# Patient Record
Sex: FEMALE
[De-identification: names, ages, dates, MRNs, and addresses within clinical notes are randomized per-mention and may not be internally consistent; named-entity substitution may affect disease eponyms.]

---

## 2018-09-25 LAB
ANION GAP SERPL CALC-SCNC: 13.6 MMOL/L (ref 8–16)
BASOPHILS # BLD AUTO: 0 10*3/UL (ref 0–0.1)
BASOPHILS NFR BLD AUTO: 0.5 % (ref 0–1)
BUN SERPL-MCNC: 11 MG/DL (ref 7–26)
BUN/CREAT SERPL: 13 (ref 6–25)
CALCIUM SERPL-MCNC: 8.7 MG/DL (ref 8.4–10.2)
CHLORIDE SERPL-SCNC: 106 MMOL/L (ref 98–107)
CO2 SERPL-SCNC: 25 MMOL/L (ref 22–29)
DEPRECATED NEUTROPHILS # BLD AUTO: 3.4 10*3/UL (ref 2.1–6.9)
EGFRCR SERPLBLD CKD-EPI 2021: > 60 ML/MIN (ref 60–?)
EOSINOPHIL # BLD AUTO: 0.1 10*3/UL (ref 0–0.4)
EOSINOPHIL NFR BLD AUTO: 1.4 % (ref 0–6)
ERYTHROCYTE [DISTWIDTH] IN CORD BLOOD: 12.9 % (ref 11.7–14.4)
GLUCOSE SERPLBLD-MCNC: 89 MG/DL (ref 74–118)
HCT VFR BLD AUTO: 43.5 % (ref 34.2–44.1)
HGB BLD-MCNC: 13.7 G/DL (ref 12–16)
LYMPHOCYTES # BLD: 2.5 10*3/UL (ref 1–3.2)
LYMPHOCYTES NFR BLD AUTO: 38.2 % (ref 18–39.1)
MCH RBC QN AUTO: 26.9 PG (ref 28–32)
MCHC RBC AUTO-ENTMCNC: 31.5 G/DL (ref 31–35)
MCV RBC AUTO: 85.3 FL (ref 81–99)
MONOCYTES # BLD AUTO: 0.5 10*3/UL (ref 0.2–0.8)
MONOCYTES NFR BLD AUTO: 7 % (ref 4.4–11.3)
NEUTS SEG NFR BLD AUTO: 52.7 % (ref 38.7–80)
PLATELET # BLD AUTO: 179 X10E3/UL (ref 140–360)
POTASSIUM SERPL-SCNC: 3.6 MMOL/L (ref 3.5–5.1)
RBC # BLD AUTO: 5.1 X10E6/UL (ref 3.6–5.1)
SODIUM SERPL-SCNC: 141 MMOL/L (ref 136–145)

## 2018-09-25 NOTE — DIAGNOSTIC IMAGING REPORT
EXAMINATION: PA and lateral views of the chest.



COMPARISON: None



CLINICAL HISTORY:  Preoperative evaluation, plantar fasciitis

     

DISCUSSION:



Lines/tubes:  None.



Lungs:  The lungs are well inflated and clear. No pneumonia or pulmonary edema.



Pleura:  No pleural effusion or pneumothorax.



Heart and mediastinum:  The cardiomediastinal silhouette is normal.



Bones and soft tissues:  No acute bony abnormalities.  



IMPRESSION: 

No acute cardiopulmonary abnormalities.











Signed by: Dr. Andrew Palisch, M.D. on 9/25/2018 4:35 PM

## 2018-09-28 ENCOUNTER — HOSPITAL ENCOUNTER (OUTPATIENT)
Dept: HOSPITAL 88 - OR | Age: 63
Discharge: HOME | End: 2018-09-28
Attending: PODIATRIST
Payer: COMMERCIAL

## 2018-09-28 VITALS — SYSTOLIC BLOOD PRESSURE: 137 MMHG | DIASTOLIC BLOOD PRESSURE: 85 MMHG

## 2018-09-28 DIAGNOSIS — Z01.812: ICD-10-CM

## 2018-09-28 DIAGNOSIS — Z01.811: ICD-10-CM

## 2018-09-28 DIAGNOSIS — M77.32: Primary | ICD-10-CM

## 2018-09-28 DIAGNOSIS — Z01.810: ICD-10-CM

## 2018-09-28 DIAGNOSIS — M72.2: ICD-10-CM

## 2018-09-28 PROCEDURE — 85025 COMPLETE CBC W/AUTO DIFF WBC: CPT

## 2018-09-28 PROCEDURE — 28119 REMOVAL OF HEEL SPUR: CPT

## 2018-09-28 PROCEDURE — 36415 COLL VENOUS BLD VENIPUNCTURE: CPT

## 2018-09-28 PROCEDURE — 76000 FLUOROSCOPY <1 HR PHYS/QHP: CPT

## 2018-09-28 PROCEDURE — 93005 ELECTROCARDIOGRAM TRACING: CPT

## 2018-09-28 PROCEDURE — 80048 BASIC METABOLIC PNL TOTAL CA: CPT

## 2018-09-28 PROCEDURE — 71046 X-RAY EXAM CHEST 2 VIEWS: CPT

## 2018-09-28 NOTE — OPERATIVE REPORT
DATE OF PROCEDURE:  September 28, 2018 

 

PREOPERATIVE DIAGNOSES

1. Plantar fasciitis.  

2. Plantar calcaneal spur, left foot.



POSTOPERATIVE DIAGNOSES

1. Plantar fasciitis.  

2. Plantar calcaneal spur, left foot.



TITLE OF OPERATION:  Endoscopic plantar fasciotomy of the left foot with 

excision of the heel spur, left foot.



ANESTHESIA:  General endotracheal.



HEMOSTASIS:  Left thigh tourniquet at 350 mmHg.



PROCEDURE IN DETAIL:  The patient was taken to the operating room in a 

mildly sedated state and placed upon the operating table in the supine 

position.  Following induction of general anesthetic, the left lower 

extremity was elevated to 60 degrees to exsanguinate before inflating the 

pneumatic thigh tourniquet to 350 mmHg to create hemostasis.  The left 

lower extremity was placed upon the operating table prior to performing the 

following procedures:  



Procedure #1:  Endoscopic plantar fasciotomy of the left foot.  A medial 

stab incision was placed and cannula introduced.  Ultimately a camera was 

introduced as well, which allowed for direct visualization of the plantar 

fascia on the left foot, which was noted to be contracted.  A hook knife 

was used to elongate the plantar fascia of the left foot.  The lateral 

aspect was otherwise well documented.  The exit wound at the lateral aspect 

was easily closed after irrigation with copious amounts of sterile saline 

solution.  Attention was directed to the medial wound, which was noted to 

be contracted also.  That medial wound was extended in order to reach the 

plantar medial aspect of the calcaneus where a large plantar calcaneal spur 

was noted.  Under fluoroscopy, a power rasp was used to smooth that spur.  

All surrounding constricting materials were removed.  A section of the 

plantar fascia was removed as well.  The area was irrigated with copious 

amounts of sterile saline solution.  Deep closure was 3-0 Vicryl.  

Subcutaneous closure was 4-0 Vicryl.  Skin closure was 4-0 nylon.  A graft 

of human tissue allograft was placed within the confines of the plantar 

fascia in order to facilitate healing.  That area was then blocked with 0.5 

Marcaine and Decadron [place].  Release of the pneumatic thigh tourniquet showed 

a normal hyperemic flush to all digits of the left foot.  Patient left the 

operating room with vital signs stable and in apparent satisfactory 

condition, having tolerated both the anesthetic and the procedure very 

well.





  _________________________________

PHUONG MCGARRY DPM



DD:  09/28/2018 11:03

DT:  09/28/2018 12:27

Job#:  [id number]

## 2022-05-31 ENCOUNTER — HOSPITAL ENCOUNTER (OUTPATIENT)
Dept: HOSPITAL 88 - CT | Age: 67
End: 2022-05-31
Attending: FAMILY MEDICINE
Payer: MEDICARE

## 2022-05-31 DIAGNOSIS — R10.33: Primary | ICD-10-CM

## 2022-05-31 DIAGNOSIS — R22.2: ICD-10-CM

## 2022-05-31 LAB
BUN SERPL-MCNC: 13 MG/DL (ref 7–26)
BUN/CREAT SERPL: 17 (ref 6–25)

## 2022-05-31 PROCEDURE — 84520 ASSAY OF UREA NITROGEN: CPT

## 2022-05-31 PROCEDURE — 74176 CT ABD & PELVIS W/O CONTRAST: CPT

## 2022-05-31 PROCEDURE — 36415 COLL VENOUS BLD VENIPUNCTURE: CPT

## 2022-05-31 PROCEDURE — 82565 ASSAY OF CREATININE: CPT

## 2022-06-20 LAB
ANION GAP SERPL CALC-SCNC: 14.4 MMOL/L (ref 8–16)
BASOPHILS # BLD AUTO: 0 10*3/UL (ref 0–0.1)
BASOPHILS NFR BLD AUTO: 0.6 % (ref 0–1)
BUN SERPL-MCNC: 11 MG/DL (ref 7–26)
BUN/CREAT SERPL: 13 (ref 6–25)
CALCIUM SERPL-MCNC: 9 MG/DL (ref 8.4–10.2)
CHLORIDE SERPL-SCNC: 107 MMOL/L (ref 98–107)
CO2 SERPL-SCNC: 28 MMOL/L (ref 22–29)
DEPRECATED NEUTROPHILS # BLD AUTO: 4.2 10*3/UL (ref 2.1–6.9)
EOSINOPHIL # BLD AUTO: 0 10*3/UL (ref 0–0.4)
EOSINOPHIL NFR BLD AUTO: 0.3 % (ref 0–6)
ERYTHROCYTE [DISTWIDTH] IN CORD BLOOD: 13.2 % (ref 11.7–14.4)
GLUCOSE SERPLBLD-MCNC: 99 MG/DL (ref 74–118)
HCT VFR BLD AUTO: 47.2 % (ref 34.2–44.1)
HGB BLD-MCNC: 14.2 G/DL (ref 12–16)
LYMPHOCYTES # BLD: 1.8 10*3/UL (ref 1–3.2)
LYMPHOCYTES NFR BLD AUTO: 27.2 % (ref 18–39.1)
MCH RBC QN AUTO: 26.4 PG (ref 28–32)
MCHC RBC AUTO-ENTMCNC: 30.1 G/DL (ref 31–35)
MCV RBC AUTO: 87.7 FL (ref 81–99)
MONOCYTES # BLD AUTO: 0.4 10*3/UL (ref 0.2–0.8)
MONOCYTES NFR BLD AUTO: 6 % (ref 4.4–11.3)
NEUTS SEG NFR BLD AUTO: 65.7 % (ref 38.7–80)
PLATELET # BLD AUTO: 186 X10E3/UL (ref 140–360)
POTASSIUM SERPL-SCNC: 4.4 MMOL/L (ref 3.5–5.1)
RBC # BLD AUTO: 5.38 X10E6/UL (ref 3.6–5.1)
SODIUM SERPL-SCNC: 145 MMOL/L (ref 136–145)

## 2022-06-23 ENCOUNTER — HOSPITAL ENCOUNTER (OUTPATIENT)
Dept: HOSPITAL 88 - OR | Age: 67
Discharge: HOME | End: 2022-06-23
Attending: SURGERY
Payer: MEDICARE

## 2022-06-23 VITALS — DIASTOLIC BLOOD PRESSURE: 80 MMHG | SYSTOLIC BLOOD PRESSURE: 147 MMHG

## 2022-06-23 DIAGNOSIS — K43.9: Primary | ICD-10-CM

## 2022-06-23 DIAGNOSIS — Z88.1: ICD-10-CM

## 2022-06-23 DIAGNOSIS — Z01.818: ICD-10-CM

## 2022-06-23 DIAGNOSIS — Z01.810: ICD-10-CM

## 2022-06-23 DIAGNOSIS — Z01.812: ICD-10-CM

## 2022-06-23 PROCEDURE — 93005 ELECTROCARDIOGRAM TRACING: CPT

## 2022-06-23 PROCEDURE — 0223U NFCT DS 22 TRGT SARS-COV-2: CPT

## 2022-06-23 PROCEDURE — 49560: CPT

## 2022-06-23 PROCEDURE — 80048 BASIC METABOLIC PNL TOTAL CA: CPT

## 2022-06-23 PROCEDURE — 49568: CPT

## 2022-06-23 PROCEDURE — 71046 X-RAY EXAM CHEST 2 VIEWS: CPT

## 2022-06-23 PROCEDURE — 85025 COMPLETE CBC W/AUTO DIFF WBC: CPT

## 2022-06-23 PROCEDURE — 36415 COLL VENOUS BLD VENIPUNCTURE: CPT
